# Patient Record
(demographics unavailable — no encounter records)

---

## 2024-11-11 NOTE — HISTORY OF PRESENT ILLNESS
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

## 2024-11-11 NOTE — HISTORY OF PRESENT ILLNESS
[Medical Office: ([facility])___] : at the medical office located in  [Verbal consent obtained from patient] : the patient, [unfilled] [Other Location: e.g. School (Enter Location, City,State)___] : at [unfilled], at the time of the visit. [FreeTextEntry1] : 54 yo man with obesity, htn  on olmasartan for HTN reports that weight is a constant struggle, source of foods + lifestyle tend to be issues wife works at [employer] and has issues preparing dinner - a lot of takeout  current weight = 225 lbs lifetime max weight = 225 lbs height  = 5'8  Interim hx: -referred by Dr Mejia for lifestyle modification -met w/Dr Mejia last week, opting for lifestyle change at this time outlines struggle w/ 1) choice of foods 2)  type of diet (has tried several) 3) lifestyle- 3 kids in teenage years -struggles w/sweets when he has energy lulls or stress -sleep is ok, mouth breather and some snoring due to deviated septum -exercise on/off Diet recall: B: greek yogurt w/honey. Cereal w/bananas or something quick and not healthy L: typically a salad or something ordered out when at work D: no structure, often running to kids events and grabbing food on the way home.  -would like to meal prep, open minded to trying different foods, plans.  -has plan to start going to the gym w/his son.  -reviewed labwork from last year, plans to get new labs at some point as per discussion w/Dr Mejia.   Pt motivated to improve weight and health.

## 2024-11-11 NOTE — REASON FOR VISIT
[Home] : at home, [unfilled] , at the time of the visit. [Medical Office: ([facility])___] : at the medical office located in  [Patient] : the patient [Initial Evaluation] : an initial evaluation [FreeTextEntry1] : obesity

## 2024-11-11 NOTE — REASON FOR VISIT
[Home] : at home, [unfilled] , at the time of the visit. [Medical Office: ([facility])___] : at the medical office located in  [Patient] : the patient [Initial Evaluation] : an initial evaluation [FreeTextEntry1] : obesity

## 2024-11-11 NOTE — ASSESSMENT
[FreeTextEntry1] :  Bariatric surgery history:  Obesity co-morbidities: HTN, mild HLD  Comorbidities improved or resolved:  Anti-obesity Medications: none  Obesity medication side effects:  55 year old male w/class 1 obesity, HTN, mixed HLD presents for obesity medicine follow up.   Plan: Reviewed healthy eating principles. Will send nutrition resources Recommend whole food based eating plan limiting refined carbs/added fats w/increased fiber in diet via fruit, veggies, whole grains, legumes, nuts/seeds.  3 meals/day. Food journal prior to next visit. Limit snacking and liquid calories Holding off on AOMs for now. Would benefit from new baseline labwork.  Discussed starting exercise plan incorporating resistance and cardio training. Also discussed concept of exercise snacks throughout the day.  Meal prep/food delivery to supplement meals and limit takeout food.  Aim for 7-8 hours sleep nightly. Stress management.   RTC in 4 weeks w/me and 6-8 weeks w/Dr Mejia.

## 2024-11-11 NOTE — HISTORY OF PRESENT ILLNESS
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

## 2024-11-11 NOTE — ASSESSMENT
[FreeTextEntry1] : BARIATRIC SURGERY HISTORY: none  OBESITY COMORBIDITIES: HTN  ANTI-OBESITY MEDICATIONS: none  OBESITY MEDICATION SIDE EFFECTS: n/a  Recommend the following: reviewed etabolic labs whole foods based eating strategy limiting refined carbs and added fats - recommend plant forward approach return to regular gym activity would like to start with intensive lifestyle therapy and not start with AOMs for now can work with NP on intensive lifestyle modification refer for sleep consult rtc in  6-8 weeks.

## 2024-11-18 NOTE — HISTORY OF PRESENT ILLNESS
[FreeTextEntry1] : spots on the skin [de-identified] : 55M with no pfhx of skin cancer here for spots on the skin. Notes some dryness on the LEs

## 2024-11-18 NOTE — PHYSICAL EXAM
[Alert] : alert [Oriented x 3] : ~L oriented x 3 [Well Nourished] : well nourished [Conjunctiva Non-injected] : conjunctiva non-injected [No Visual Lymphadenopathy] : no visual  lymphadenopathy [No Clubbing] : no clubbing [No Edema] : no edema [No Bromhidrosis] : no bromhidrosis [No Chromhidrosis] : no chromhidrosis [FreeTextEntry3] : The patient is well-appearing, in no acute distress, alert and oriented x 3. Mood and affect are normal. A complete cutaneous examination of the scalp, face, neck, chest, abdomen, back, bilateral arms, bilateral legs, buttocks, digits, nails, eyelids, conjunctiva and lips reveals the following significant findings: -Tan to dark brown macules on the trunk and extremities with no concerning dermoscopic features  -Tan to dark brown stuck-on plaques on the trunk and extremities  -Diffuse xerosis

## 2024-12-09 NOTE — ASSESSMENT
[FreeTextEntry1] :  Bariatric surgery history:  Obesity co-morbidities: HTN, mild HLD  Comorbidities improved or resolved:  Anti-obesity Medications: none  Obesity medication side effects:  55 year old male w/class 1 obesity, HTN, mixed HLD presents for obesity medicine follow up.   Plan: Recommend whole food based eating plan limiting refined carbs/added fats w/increased fiber in diet via fruit, veggies, whole grains, legumes, nuts/seeds. Sending more nutrition resources.  3 meals/day. Food journal prior to next visit. Limit snacking and liquid calories Holding off on AOMs for now. Going for PE/labwork in March  Going to [employer] 2-3 days/week. Discussed walking/movement at least 2 other days weekly.   Meal prep/food delivery to supplement meals and limit takeout food.  Aim for 7-8 hours sleep nightly. Stress management.   RTC in 6 weeks w/me and in February w/Dr Mejia

## 2024-12-09 NOTE — HISTORY OF PRESENT ILLNESS
[Other Location: e.g. School (Enter Location, City,State)___] : at [unfilled], at the time of the visit. [Medical Office: ([facility])___] : at the medical office located in  [Verbal consent obtained from patient] : the patient, [unfilled] [FreeTextEntry1] : 56 yo man with obesity, htn  on olmasartan for HTN reports that weight is a constant struggle, source of foods + lifestyle tend to be issues wife works at [employer] and has issues preparing dinner - a lot of takeout  current weight = 223.5 lbs lifetime max weight = 225 lbs height  = 5'8  Interim hx: -wt down 1-1.5 lbs over past month -has started to make some dietary shifts -has reduced meat intake, feels less bloated. Eating beans and salad often for lunch. Eating less processed foods. More salads w/dinner.  -starting to workout again. Goes to [employer] 2-3 days/week.  -noticed sleep quality worse when eating out.  -seeing PCP in March and to have updated labwork at that time.  -reviewed materials sent at last visit.  -discussed wt loss goal that is reasonable 0.5lb-2lb/week.   Pt motivated to improve weight and health.

## 2025-03-25 NOTE — HEALTH RISK ASSESSMENT
[Good] : ~his/her~  mood as  good [0] : 2) Feeling down, depressed, or hopeless: Not at all (0) [Patient reported colonoscopy was normal] : Patient reported colonoscopy was normal [ColonoscopyDate] : 2023